# Patient Record
Sex: FEMALE | Race: WHITE | Employment: FULL TIME | URBAN - METROPOLITAN AREA
[De-identification: names, ages, dates, MRNs, and addresses within clinical notes are randomized per-mention and may not be internally consistent; named-entity substitution may affect disease eponyms.]

---

## 2020-10-19 ENCOUNTER — OFFICE VISIT (OUTPATIENT)
Dept: SURGERY | Age: 45
End: 2020-10-19
Payer: COMMERCIAL

## 2020-10-19 VITALS
DIASTOLIC BLOOD PRESSURE: 77 MMHG | SYSTOLIC BLOOD PRESSURE: 149 MMHG | HEART RATE: 79 BPM | TEMPERATURE: 97.4 F | BODY MASS INDEX: 28.66 KG/M2 | WEIGHT: 172 LBS | HEIGHT: 65 IN

## 2020-10-19 DIAGNOSIS — Q83.1 ACCESSORY BREAST TISSUE OF AXILLA: Primary | ICD-10-CM

## 2020-10-19 PROCEDURE — 99242 OFF/OP CONSLTJ NEW/EST SF 20: CPT | Performed by: SURGERY

## 2020-10-19 RX ORDER — FLUTICASONE FUROATE AND VILANTEROL TRIFENATATE 100; 25 UG/1; UG/1
1 POWDER RESPIRATORY (INHALATION)
COMMUNITY

## 2020-10-19 RX ORDER — MONTELUKAST SODIUM 10 MG/1
TABLET ORAL
COMMUNITY
Start: 2020-09-25

## 2020-10-19 RX ORDER — FLUCONAZOLE 100 MG/1
TABLET ORAL
COMMUNITY
Start: 2020-07-22

## 2020-10-19 RX ORDER — CHOLECALCIFEROL (VITAMIN D3) 50 MCG
CAPSULE ORAL
COMMUNITY

## 2020-10-19 NOTE — PROGRESS NOTES
HISTORY OF PRESENT ILLNESS  Kelly Muniz is a 39 y.o. female. HPI  NEW patient consult referred by  Ms. Ángela Chacon NP for RIGHT axillary lump. Has a lump to the RIGHT axilla. It has been growing in size. It rubs and is annoying and can be tender. Denies any other breast changes. Family History:    Denies FH of breast or ovarian cancer. Maternal grandmother had uterine cancer. Mammogram, [facility], 7/2/20, BIRADS 1    Review of Systems   HENT: Positive for congestion. All other systems reviewed and are negative. Physical Exam  Vitals signs and nursing note reviewed. Constitutional:       Appearance: She is well-developed. HENT:      Head: Normocephalic. Neck:      Musculoskeletal: Neck supple. Thyroid: No thyromegaly. Cardiovascular:      Rate and Rhythm: Normal rate and regular rhythm. Heart sounds: Normal heart sounds. Pulmonary:      Effort: Pulmonary effort is normal.      Breath sounds: Normal breath sounds. Chest:      Breasts: Breasts are symmetrical.         Right: No inverted nipple, mass, nipple discharge, skin change or tenderness. Left: No inverted nipple, mass, nipple discharge, skin change or tenderness. Comments: Right axilla with accessory breast tissue  Abdominal:      Palpations: Abdomen is soft. Musculoskeletal: Normal range of motion. Lymphadenopathy:      Cervical: No cervical adenopathy. Skin:     General: Skin is warm and dry. Neurological:      Mental Status: She is alert and oriented to person, place, and time. ASSESSMENT and PLAN    ICD-10-CM ICD-9-CM    1. Accessory breast tissue of axilla  Q83.1 757.6      - 40 yo female with right accessory breast tissue, enlarging and painful. Patient will call insurance to see if they will cover removal  And will let us know.

## 2020-10-19 NOTE — LETTER
10/19/20 Patient: Carlos Hamm YOB: 1975 Date of Visit: 10/19/2020 Joleen Schofield MD 
[facility] [id number] VIA Facsimile: [phone] Sahara Paredes NP 
[address] [address] VIA Facsimile: [phone] Dear MD Sahara Adams NP, Thank you for referring Ms. Comfort Muniz to [place] [id number] [address] [facility] for evaluation. My notes for this consultation are attached. If you have questions, please do not hesitate to call me. I look forward to following your patient along with you. Sincerely, Kelvin Rivera MD